# Patient Record
Sex: FEMALE | Race: BLACK OR AFRICAN AMERICAN | Employment: UNEMPLOYED
[De-identification: names, ages, dates, MRNs, and addresses within clinical notes are randomized per-mention and may not be internally consistent; named-entity substitution may affect disease eponyms.]

---

## 2017-08-12 ENCOUNTER — HEALTH MAINTENANCE LETTER (OUTPATIENT)
Age: 22
End: 2017-08-12

## 2019-10-01 ENCOUNTER — HEALTH MAINTENANCE LETTER (OUTPATIENT)
Age: 24
End: 2019-10-01

## 2020-12-28 ENCOUNTER — TELEPHONE (OUTPATIENT)
Dept: TRANSPLANT | Facility: CLINIC | Age: 25
End: 2020-12-28

## 2020-12-28 ENCOUNTER — DOCUMENTATION ONLY (OUTPATIENT)
Dept: TRANSPLANT | Facility: CLINIC | Age: 25
End: 2020-12-28

## 2020-12-28 DIAGNOSIS — Z00.5 TRANSPLANT DONOR EVALUATION: Primary | ICD-10-CM

## 2020-12-28 NOTE — TELEPHONE ENCOUNTER
"[facility] BREEZE  [id number]      LIVING KIDNEY DONOR EVALUATION  Donor First Name Daniel Donor MRN    Donor Last Name Nancy Completed 10/6/2020 2:32 PM    1995 Record ID [id number]   BREEZE Screen PASSED     Intended Recipient  Recipient First Name Nuria Recipient MRN    Recipient Last Name Nancy Relationship Child   Recipient  -05-15 Recipient Diagnosis    Recipient's ABO      Donor Information  Age 24 Gender Female   Ht 163 cm (5' 4'') Race Black or African American   Wt 81.6 kg (180 lbs) Ethnicity Not /   BMI 30.90 kg/m  Preferred Language English      Required No     Blood Type Unknown   Demographics  [phone]   [facility] Type [facility] Alternate #    [facility] Code [id number] Type    Country [place] Preferred Contact day Fri, Ender   Email [email or web address] Preferred Contact time 11:00 AM-1:00 PM, 1:00 PM-4:00 PM, 09:00 AM-11:00 AM   &&   Donor's Medical Information  Medical History Anxiety d/o NOS   Depression   Panic d/o NOS   other (\"Anxiety\") Medications Bupropion Extended-Release   Gabapentin   Multivitamin   Probiotics   Venlafaxine ER   Surgical History Tonsillectomy Allergies NKDA   Social History EtOH: Rare (1-2 drinks/month)   Illicit Drug Use: Denies   Tobacco: Denies Self-Reported Functional Status \"I am able to participate in strenuous sports such as swimming, singles tennis, football, basketball, or skiing\"   Family Medical History Cancer (Grandparent)   Diabetes (Mother, Grandparent)   Heart Disease (denies)   Hypertension (Father)   Kidney Disease (Mother)   Kidney Stones (denies) Exercise Frequency Exercise (>3 times per week)   Review of Organ Systems  Review of Systems Airway or Lungs: No   Blood Disorder: No   Cancer: No   Diabetes,Thyroid,Adrenal,Endocrine Disorder: No   Digestive or Liver: No   Female Health: No   Heart or Circulatory System: No   Immune Diseases: No   Kidneys and " Bladder: No   Muscles,Bones,Joints: No   Neuro: No   Psych: Yes   &&   Donor's Social Information  Marital Status Single Living Accommodation Lives in rented accommodation   Level of Education Some college education Living Arrangement With no relatives, residential arrangement   Employment Status Full Time Concerns: health and life insurance No   Employer [employer] Concerns: job security and lost income No   Occupation      Medical Insurance Status No medical insurance     High Risk Behavior  High Risk Behaviors Blood transfusion < 12 months. (NO)   Commercial sex < 12 months. (NO)   Illicit IV drug use < 5yrs. (NO)   Other high risk sexual contact < 12 months. (NO)   Reason for Donation  Referral Friend or Family of Tx Candidate Reason for Donation I am interested because this donation is for my mother! She still has a lot left to do in her life and future grandchildren to meet.   Permission to Disclose Inquiry Yes Patient Comments    Donor Motivation Level Highly motivated donor     PCP Contact  PCP Name    PCP [facility]    PCP State    PCP Phone    Emergency Contact  First Name Gabrielle First Name Santhosh   Last Name Oatjim Last Name [place]   Phone # [phone] Phone # [phone]   Phone Type Mobile Phone Type Mobile   Relationship Sister Relationship Father   Office Use  Reviewed By    Reviewed 12/28/2020 2:17 PM   Admin Folder Archive   Comments    Lost for Followup    Extended Comments    [facility]ZE ID [id number] survey status completed   Activity History  Call  Due Date 12/28/2020   Last Modified Date/Time 12/28/2020 1:55 PM   Comments    Call  Due Date 12/23/2020   Last Modified Date/Time 12/23/2020 1:05 PM   Comments    Call  Due Date 10/20/2020   Last Modified Date/Time 10/20/2020 9:00 AM   Comments

## 2021-01-14 ENCOUNTER — TRANSFERRED RECORDS (OUTPATIENT)
Dept: HEALTH INFORMATION MANAGEMENT | Facility: CLINIC | Age: 26
End: 2021-01-14

## 2021-01-14 LAB
ALBUMIN (URINE) MG/SPEC: <7 MG/L
ALBUMIN/CREATININE RATIO: <6.9 MCG/MG CREAT
CHOLEST SERPL-MCNC: 152 MG/DL
CREAT SERPL-MCNC: 1.18 MG/DL
CREATININE (URINE): 101.8 MG/DL (ref 16–327)
GFR SERPL CREATININE-BSD FRML MDRD: 73 ML/MIN/BSA
GLUCOSE SERPL-MCNC: 85 MG/DL (ref 70–99)
HBA1C MFR BLD: 5.2 % (ref 4.6–5.6)
HDLC SERPL-MCNC: 55 MG/DL
LDLC SERPL CALC-MCNC: 85 MG/DL
NONHDLC SERPL-MCNC: 97 MG/DL
TRIGL SERPL-MCNC: 59 MG/DL

## 2021-01-15 ENCOUNTER — HEALTH MAINTENANCE LETTER (OUTPATIENT)
Age: 26
End: 2021-01-15

## 2021-01-19 ENCOUNTER — DOCUMENTATION ONLY (OUTPATIENT)
Dept: TRANSPLANT | Facility: CLINIC | Age: 26
End: 2021-01-19

## 2021-02-09 DIAGNOSIS — Z00.5 TRANSPLANT DONOR EVALUATION: Primary | ICD-10-CM

## 2021-02-22 DIAGNOSIS — Z00.5 TRANSPLANT DONOR EVALUATION: ICD-10-CM

## 2021-03-01 LAB
A* LOCUS: NORMAL
A*: NORMAL
ABTEST METHOD: NORMAL
B* LOCUS: NORMAL
B*: NORMAL
BW-1: NORMAL
BW-2: NORMAL
C* LOCUS: NORMAL
C*: NORMAL
DPA1* LOCUS NMDP: NORMAL
DPA1* NMDP: NORMAL
DPA1*: NORMAL
DPA1*LOCUS: NORMAL
DPB1* LOCUS NMDP: NORMAL
DPB1* NMDP: NORMAL
DPB1*: NORMAL
DPB1*LOCUS: NORMAL
DQA1*: NORMAL
DQA1*LOCUS: NORMAL
DQB1* LOCUS: NORMAL
DQB1*: NORMAL
DRB1* LOCUS: NORMAL
DRB1*: NORMAL
DRB4* LOCUS: NORMAL
DRSSO TEST METHOD: NORMAL

## 2021-03-10 ENCOUNTER — TELEPHONE (OUTPATIENT)
Dept: TRANSPLANT | Facility: CLINIC | Age: 26
End: 2021-03-10

## 2021-03-10 NOTE — TELEPHONE ENCOUNTER
Reviewed Virtual XM results at [facility].Per Dr Bazzi there are DSA present that would che a problem and she could NOT be a direct donor for Mom.Now spoke with carlos and understands results. Still interested in PEP.Will be on backburner due to another donor doing further tests now.

## 2021-09-04 ENCOUNTER — HEALTH MAINTENANCE LETTER (OUTPATIENT)
Age: 26
End: 2021-09-04

## 2022-02-19 ENCOUNTER — HEALTH MAINTENANCE LETTER (OUTPATIENT)
Age: 27
End: 2022-02-19

## 2022-10-16 ENCOUNTER — HEALTH MAINTENANCE LETTER (OUTPATIENT)
Age: 27
End: 2022-10-16

## 2023-04-01 ENCOUNTER — HEALTH MAINTENANCE LETTER (OUTPATIENT)
Age: 28
End: 2023-04-01